# Patient Record
Sex: FEMALE | Race: BLACK OR AFRICAN AMERICAN | NOT HISPANIC OR LATINO | Employment: FULL TIME | URBAN - METROPOLITAN AREA
[De-identification: names, ages, dates, MRNs, and addresses within clinical notes are randomized per-mention and may not be internally consistent; named-entity substitution may affect disease eponyms.]

---

## 2022-01-28 ENCOUNTER — TRANSFERRED RECORDS (OUTPATIENT)
Dept: FAMILY MEDICINE | Facility: CLINIC | Age: 27
End: 2022-01-28

## 2022-11-11 ENCOUNTER — OFFICE VISIT (OUTPATIENT)
Dept: FAMILY MEDICINE | Facility: CLINIC | Age: 27
End: 2022-11-11

## 2022-11-11 VITALS
OXYGEN SATURATION: 98 % | SYSTOLIC BLOOD PRESSURE: 122 MMHG | WEIGHT: 198.8 LBS | HEART RATE: 69 BPM | DIASTOLIC BLOOD PRESSURE: 82 MMHG | RESPIRATION RATE: 16 BRPM | TEMPERATURE: 97.1 F | HEIGHT: 64 IN | BODY MASS INDEX: 33.94 KG/M2

## 2022-11-11 DIAGNOSIS — Z11.3 SCREEN FOR STD (SEXUALLY TRANSMITTED DISEASE): ICD-10-CM

## 2022-11-11 DIAGNOSIS — M67.40 GANGLION CYST: ICD-10-CM

## 2022-11-11 DIAGNOSIS — Z11.4 ENCOUNTER FOR SCREENING FOR HIV: ICD-10-CM

## 2022-11-11 DIAGNOSIS — Z00.00 ROUTINE GENERAL MEDICAL EXAMINATION AT A HEALTH CARE FACILITY: Primary | ICD-10-CM

## 2022-11-11 DIAGNOSIS — N64.52 NIPPLE DISCHARGE: ICD-10-CM

## 2022-11-11 DIAGNOSIS — T16.2XXA FB EAR, LEFT, INITIAL ENCOUNTER: ICD-10-CM

## 2022-11-11 DIAGNOSIS — J45.20 ASTHMA, MILD INTERMITTENT, WELL-CONTROLLED: ICD-10-CM

## 2022-11-11 DIAGNOSIS — Z11.59 NEED FOR HEPATITIS C SCREENING TEST: ICD-10-CM

## 2022-11-11 DIAGNOSIS — H60.392 OTHER INFECTIVE ACUTE OTITIS EXTERNA OF LEFT EAR: ICD-10-CM

## 2022-11-11 DIAGNOSIS — F33.9 RECURRENT MAJOR DEPRESSIVE DISORDER, REMISSION STATUS UNSPECIFIED (H): ICD-10-CM

## 2022-11-11 DIAGNOSIS — Z23 NEEDS FLU SHOT: ICD-10-CM

## 2022-11-11 PROBLEM — J30.2 SEASONAL ALLERGIES: Status: ACTIVE | Noted: 2021-10-21

## 2022-11-11 LAB
CHOL/HDL RATIO (RMG): 5.6 MG/DL (ref 0–4.5)
CHOLESTEROL: 241 MG/DL (ref 100–199)
HDL (RMG): 43 MG/DL (ref 40–?)
LDL CALCULATED (RMG): 180 MG/DL (ref 0–130)
PAP-ABSTRACT: NORMAL
TRIGLYCERIDES (RMG): 92 MG/DL (ref 0–149)

## 2022-11-11 PROCEDURE — 99395 PREV VISIT EST AGE 18-39: CPT | Performed by: FAMILY MEDICINE

## 2022-11-11 PROCEDURE — 99213 OFFICE O/P EST LOW 20 MIN: CPT | Mod: 25 | Performed by: FAMILY MEDICINE

## 2022-11-11 PROCEDURE — 69200 CLEAR OUTER EAR CANAL: CPT | Performed by: FAMILY MEDICINE

## 2022-11-11 PROCEDURE — 80061 LIPID PANEL: CPT | Mod: QW | Performed by: FAMILY MEDICINE

## 2022-11-11 PROCEDURE — 36415 COLL VENOUS BLD VENIPUNCTURE: CPT | Performed by: FAMILY MEDICINE

## 2022-11-11 RX ORDER — ALBUTEROL SULFATE 90 UG/1
2 AEROSOL, METERED RESPIRATORY (INHALATION)
COMMUNITY
Start: 2021-11-30 | End: 2023-04-27

## 2022-11-11 RX ORDER — FLUTICASONE PROPIONATE 220 UG/1
2 AEROSOL, METERED RESPIRATORY (INHALATION)
COMMUNITY
Start: 2021-11-30

## 2022-11-11 RX ORDER — ERGOCALCIFEROL 1.25 MG/1
50000 CAPSULE ORAL
COMMUNITY
Start: 2021-12-21 | End: 2022-11-11

## 2022-11-11 RX ORDER — VALACYCLOVIR HYDROCHLORIDE 1 G/1
2000 TABLET, FILM COATED ORAL
COMMUNITY
Start: 2022-04-14 | End: 2023-08-10

## 2022-11-11 RX ORDER — HYDROCODONE BITARTRATE AND ACETAMINOPHEN 5; 325 MG/1; MG/1
1-2 TABLET ORAL
COMMUNITY
Start: 2022-06-10 | End: 2022-11-11

## 2022-11-11 RX ORDER — LORATADINE 10 MG/1
10 TABLET ORAL
COMMUNITY
Start: 2021-10-12 | End: 2023-07-21

## 2022-11-11 ASSESSMENT — PATIENT HEALTH QUESTIONNAIRE - PHQ9
SUM OF ALL RESPONSES TO PHQ QUESTIONS 1-9: 6
5. POOR APPETITE OR OVEREATING: MORE THAN HALF THE DAYS

## 2022-11-11 ASSESSMENT — ANXIETY QUESTIONNAIRES
GAD7 TOTAL SCORE: 10
2. NOT BEING ABLE TO STOP OR CONTROL WORRYING: MORE THAN HALF THE DAYS
7. FEELING AFRAID AS IF SOMETHING AWFUL MIGHT HAPPEN: NOT AT ALL
GAD7 TOTAL SCORE: 10
6. BECOMING EASILY ANNOYED OR IRRITABLE: NOT AT ALL
1. FEELING NERVOUS, ANXIOUS, OR ON EDGE: MORE THAN HALF THE DAYS
5. BEING SO RESTLESS THAT IT IS HARD TO SIT STILL: MORE THAN HALF THE DAYS
3. WORRYING TOO MUCH ABOUT DIFFERENT THINGS: MORE THAN HALF THE DAYS

## 2022-11-11 ASSESSMENT — ASTHMA QUESTIONNAIRES: ACT_TOTALSCORE: 21

## 2022-11-11 NOTE — PROGRESS NOTES
"Female Preventive Health Visit    SUBJECTIVE:    This 27 year old female presents for a routine preventive physical exam.    The patient has the following concerns:     1. BMI 34 : 30 lbs intentional weight loss over last 7 months or so. Now working out and building muscle.     2. MDD / TUSHAR : fluoxetine 20 mg daily. Taking since Jan 2022. Reports good effect.     3. Asthma : onset in childhood. flovent 220 mcg 2 puffs BID during allergy season and for URIs. Albuterol prn. Not exercise induced.     4. Lesion to left hand present for 2 months. Right handed. Not painful. Not affecting function of hand. Right hand dominant.     5. Q tip stuck in left ear canal. Has been using to itch ear.    6. Chronic h/o right nipple discharge. 4-5 years. No bloody discharge. White discharge. No change in overlying skin of nipple. Mother with h/o breast cancer at age 48.     7. Requests full STD testing. No current symptoms.     Patient's medications, allergies, past medical, surgical and family histories were reviewed and updated as appropriate.    OB/Gyn History:      Last Pap Smear: Up to date per patient report : 10/2021   Current Contraceptive Method:  Mirena IUD      Health Maintenance:  Health maintenance alerts were reviewed and updated as appropriate.      Healthy Habits:    Do you get at least three servings of calcium containing foods daily (dairy, green leafy vegetables, etc.)? No    Amount of exercise or daily activities, outside of work: 3-4 day(s) per week    Problems taking medications regularly No    Medication side effects: No    Have you had an eye exam in the past two years? no    Do you see a dentist twice per year? yes    Do you have sleep apnea, excessive snoring or daytime drowsiness?no      OBJECTIVE:  Vitals:    11/11/22 1447   BP: 122/82   Pulse: 69   Resp: 16   Temp: 97.1  F (36.2  C)   SpO2: 98%   Weight: 90.2 kg (198 lb 12.8 oz)   Height: 1.613 m (5' 3.5\")    Body mass index is 34.66 kg/m .    General: no " acute distress, cooperative with exam.  HEENT:  Right ear canal moist with mild slough / discharge and erythema. Small piece of cotton very close to TM : removed with direct visualization without complication.     Neck:  Supple, no lymphadenopathy or thyromegaly   Breasts: breasts appear normal, no skin or nipple changes. Patient able to express white discharge from right nipple.  Lungs: clear to auscultation bilaterally, normal respiratory effort.  Heart:  RRR without murmurs, rubs or gallops.  Normal S1 and S2.  Abdomen: normal bowel sounds, nontender, no palpable organomegaly.  Skin:   No rashes.  Extremities: warm, perfused, no swelling or edema. Subcutaneous small marble sized mass on dorsal surface of  left hand. Moves with flexion of left index finger.  Neuro:  CN II-XII intact, motor & sensory function all intact.    Psych: mental status normal, mood and affect appropriate.    PHQ 11/11/2022   PHQ-9 Total Score 6   Q9: Thoughts of better off dead/self-harm past 2 weeks Not at all     TUSHAR-7 SCORE 11/11/2022   Total Score 10       ASSESSMENT / PLAN:  This 27 year old female presents for a routine preventive physical exam. Preventive health topics discussed including adequate exercise and healthy diet. Return to clinic in one year for preventative exam or sooner with any other concerns.  Other issues discussed as noted below.    Routine general medical examination at a health care facility    Asthma, mild intermittent, well-controlled  Well controlled. No refills needed.     Recurrent major depressive disorder, remission status unspecified (H)  -     FLUoxetine (PROZAC) 20 MG capsule; Take 1 capsule (20 mg) by mouth daily    BMI 34.0-34.9,adult  Commended on weight loss thus far.   -     Lipid Profile ([facility])  -     Hemoglobin A1C ([facility])    Need for hepatitis C screening test  -     HCV Antibody ([facility])    Screen for STD (sexually transmitted disease)  Encounter for screening for HIV  -     VENOUS  COLLECTION  -     HIV 1/0/2 Rflx ([facility])  -     Treponema pallidum Antibodies ([facility])  -     Chlamydia/GC Amplification ([facility])    Nipple discharge  -     MA Diagnostic Digital Bilateral; Future    FB ear, left, initial encounter  -     REMOVE FB, EXT AUDITORY CANAL    Other infective acute otitis externa of left ear  -     ciprofloxacin-hydrocortisone (CIPRO HC OTIC) 0.2-1 % otic suspension; Place 3 drops Into the left ear 2 times daily for 7 days    Ganglion cyst  Aetiology and natural progression discussed. Not causes symptoms at this time so will monitor. Referral to hand surgery if/when symptomatic.

## 2022-11-12 LAB — HBA1C MFR BLD: 5.7 % (ref 4.8–5.6)

## 2022-11-13 LAB
C TRACH DNA SPEC QL PROBE+SIG AMP: NEGATIVE
N GONORRHOEA DNA SPEC QL PROBE+SIG AMP: NEGATIVE

## 2022-11-14 LAB
HCV AB SERPL QL IA: 0.1 S/CO RATIO (ref 0–0.9)
HIV SCREEN 4TH GEN WITH RFLX: NON REACTIVE
T PALLIDUM IGG SER QL: NON REACTIVE

## 2022-11-14 RX ORDER — CIPROFLOXACIN AND DEXAMETHASONE 3; 1 MG/ML; MG/ML
4 SUSPENSION/ DROPS AURICULAR (OTIC) 2 TIMES DAILY
Qty: 2.8 ML | Refills: 0 | Status: SHIPPED | OUTPATIENT
Start: 2022-11-14 | End: 2023-02-11

## 2023-02-11 RX ORDER — CIPROFLOXACIN AND DEXAMETHASONE 3; 1 MG/ML; MG/ML
4 SUSPENSION/ DROPS AURICULAR (OTIC) 2 TIMES DAILY
Qty: 2.8 ML | Refills: 0 | Status: SHIPPED | OUTPATIENT
Start: 2023-02-11 | End: 2023-06-07

## 2023-02-12 ENCOUNTER — HEALTH MAINTENANCE LETTER (OUTPATIENT)
Age: 28
End: 2023-02-12

## 2023-04-27 DIAGNOSIS — J45.20 ASTHMA, MILD INTERMITTENT, WELL-CONTROLLED: Primary | ICD-10-CM

## 2023-04-27 RX ORDER — FLUTICASONE PROPIONATE 50 MCG
1 SPRAY, SUSPENSION (ML) NASAL DAILY
Qty: 16 ML | Refills: 3 | Status: SHIPPED | OUTPATIENT
Start: 2023-04-27

## 2023-04-27 RX ORDER — ALBUTEROL SULFATE 90 UG/1
2 AEROSOL, METERED RESPIRATORY (INHALATION) 4 TIMES DAILY
Qty: 18 G | Refills: 3 | Status: SHIPPED | OUTPATIENT
Start: 2023-04-27

## 2023-06-07 ENCOUNTER — APPOINTMENT (OUTPATIENT)
Dept: GENERAL RADIOLOGY | Facility: CLINIC | Age: 28
End: 2023-06-07
Attending: EMERGENCY MEDICINE

## 2023-06-07 ENCOUNTER — HOSPITAL ENCOUNTER (EMERGENCY)
Facility: CLINIC | Age: 28
Discharge: HOME | End: 2023-06-07
Attending: EMERGENCY MEDICINE | Admitting: EMERGENCY MEDICINE

## 2023-06-07 VITALS
WEIGHT: 212.4 LBS | OXYGEN SATURATION: 94 % | BODY MASS INDEX: 37.03 KG/M2 | TEMPERATURE: 98.7 F | SYSTOLIC BLOOD PRESSURE: 123 MMHG | HEART RATE: 94 BPM | RESPIRATION RATE: 16 BRPM | DIASTOLIC BLOOD PRESSURE: 88 MMHG

## 2023-06-07 DIAGNOSIS — M54.41 ACUTE RIGHT-SIDED LOW BACK PAIN WITH RIGHT-SIDED SCIATICA: ICD-10-CM

## 2023-06-07 DIAGNOSIS — H60.392 OTHER INFECTIVE ACUTE OTITIS EXTERNA OF LEFT EAR: ICD-10-CM

## 2023-06-07 LAB
ALBUMIN SERPL BCG-MCNC: 4.4 G/DL (ref 3.5–5.2)
ALP SERPL-CCNC: 109 U/L (ref 35–104)
ALT SERPL W P-5'-P-CCNC: 19 U/L (ref 10–35)
ANION GAP SERPL CALCULATED.3IONS-SCNC: 11 MMOL/L (ref 7–15)
AST SERPL W P-5'-P-CCNC: 22 U/L (ref 10–35)
BASOPHILS # BLD AUTO: 0.1 10E3/UL (ref 0–0.2)
BASOPHILS NFR BLD AUTO: 1 %
BILIRUB SERPL-MCNC: 0.3 MG/DL
BUN SERPL-MCNC: 10.5 MG/DL (ref 6–20)
CALCIUM SERPL-MCNC: 9.2 MG/DL (ref 8.6–10)
CHLORIDE SERPL-SCNC: 99 MMOL/L (ref 98–107)
CREAT SERPL-MCNC: 0.93 MG/DL (ref 0.51–0.95)
DEPRECATED HCO3 PLAS-SCNC: 27 MMOL/L (ref 22–29)
EOSINOPHIL # BLD AUTO: 0.1 10E3/UL (ref 0–0.7)
EOSINOPHIL NFR BLD AUTO: 2 %
ERYTHROCYTE [DISTWIDTH] IN BLOOD BY AUTOMATED COUNT: 12.8 % (ref 10–15)
GFR SERPL CREATININE-BSD FRML MDRD: 87 ML/MIN/1.73M2
GLUCOSE SERPL-MCNC: 80 MG/DL (ref 70–99)
HCG SERPL QL: NEGATIVE
HCT VFR BLD AUTO: 47.9 % (ref 35–47)
HGB BLD-MCNC: 15.6 G/DL (ref 11.7–15.7)
HOLD SPECIMEN: NORMAL
IMM GRANULOCYTES # BLD: 0 10E3/UL
IMM GRANULOCYTES NFR BLD: 0 %
LYMPHOCYTES # BLD AUTO: 2.1 10E3/UL (ref 0.8–5.3)
LYMPHOCYTES NFR BLD AUTO: 49 %
MCH RBC QN AUTO: 30.5 PG (ref 26.5–33)
MCHC RBC AUTO-ENTMCNC: 32.6 G/DL (ref 31.5–36.5)
MCV RBC AUTO: 94 FL (ref 78–100)
MONOCYTES # BLD AUTO: 0.3 10E3/UL (ref 0–1.3)
MONOCYTES NFR BLD AUTO: 6 %
NEUTROPHILS # BLD AUTO: 1.7 10E3/UL (ref 1.6–8.3)
NEUTROPHILS NFR BLD AUTO: 42 %
NRBC # BLD AUTO: 0 10E3/UL
NRBC BLD AUTO-RTO: 0 /100
PLATELET # BLD AUTO: 246 10E3/UL (ref 150–450)
POTASSIUM SERPL-SCNC: 3.5 MMOL/L (ref 3.4–5.3)
PROT SERPL-MCNC: 8.4 G/DL (ref 6.4–8.3)
RBC # BLD AUTO: 5.12 10E6/UL (ref 3.8–5.2)
SODIUM SERPL-SCNC: 137 MMOL/L (ref 136–145)
WBC # BLD AUTO: 4.2 10E3/UL (ref 4–11)

## 2023-06-07 PROCEDURE — 250N000011 HC RX IP 250 OP 636: Performed by: EMERGENCY MEDICINE

## 2023-06-07 PROCEDURE — 84703 CHORIONIC GONADOTROPIN ASSAY: CPT | Performed by: EMERGENCY MEDICINE

## 2023-06-07 PROCEDURE — 99284 EMERGENCY DEPT VISIT MOD MDM: CPT | Performed by: EMERGENCY MEDICINE

## 2023-06-07 PROCEDURE — 250N000013 HC RX MED GY IP 250 OP 250 PS 637: Performed by: EMERGENCY MEDICINE

## 2023-06-07 PROCEDURE — 96374 THER/PROPH/DIAG INJ IV PUSH: CPT | Performed by: EMERGENCY MEDICINE

## 2023-06-07 PROCEDURE — 84155 ASSAY OF PROTEIN SERUM: CPT | Performed by: EMERGENCY MEDICINE

## 2023-06-07 PROCEDURE — 36415 COLL VENOUS BLD VENIPUNCTURE: CPT | Performed by: EMERGENCY MEDICINE

## 2023-06-07 PROCEDURE — 99284 EMERGENCY DEPT VISIT MOD MDM: CPT | Mod: 25 | Performed by: EMERGENCY MEDICINE

## 2023-06-07 PROCEDURE — 85025 COMPLETE CBC W/AUTO DIFF WBC: CPT | Performed by: EMERGENCY MEDICINE

## 2023-06-07 PROCEDURE — 72100 X-RAY EXAM L-S SPINE 2/3 VWS: CPT

## 2023-06-07 RX ORDER — METHYLPREDNISOLONE 4 MG
TABLET, DOSE PACK ORAL
Qty: 21 TABLET | Refills: 0 | Status: SHIPPED | OUTPATIENT
Start: 2023-06-07 | End: 2023-08-10

## 2023-06-07 RX ORDER — CIPROFLOXACIN AND DEXAMETHASONE 3; 1 MG/ML; MG/ML
4 SUSPENSION/ DROPS AURICULAR (OTIC) 2 TIMES DAILY
Qty: 2.8 ML | Refills: 0 | Status: SHIPPED | OUTPATIENT
Start: 2023-06-07 | End: 2023-07-14

## 2023-06-07 RX ORDER — KETOROLAC TROMETHAMINE 15 MG/ML
10 INJECTION, SOLUTION INTRAMUSCULAR; INTRAVENOUS ONCE
Status: COMPLETED | OUTPATIENT
Start: 2023-06-07 | End: 2023-06-07

## 2023-06-07 RX ORDER — CYCLOBENZAPRINE HCL 10 MG
10 TABLET ORAL ONCE
Status: COMPLETED | OUTPATIENT
Start: 2023-06-07 | End: 2023-06-07

## 2023-06-07 RX ORDER — CYCLOBENZAPRINE HCL 10 MG
10 TABLET ORAL 3 TIMES DAILY PRN
Qty: 20 TABLET | Refills: 0 | Status: SHIPPED | OUTPATIENT
Start: 2023-06-07 | End: 2023-06-13

## 2023-06-07 RX ADMIN — CYCLOBENZAPRINE 10 MG: 10 TABLET, FILM COATED ORAL at 14:23

## 2023-06-07 RX ADMIN — KETOROLAC TROMETHAMINE 15 MG: 15 INJECTION, SOLUTION INTRAMUSCULAR; INTRAVENOUS at 14:23

## 2023-06-07 ASSESSMENT — ACTIVITIES OF DAILY LIVING (ADL)
ADLS_ACUITY_SCORE: 33
ADLS_ACUITY_SCORE: 35

## 2023-06-07 NOTE — ED TRIAGE NOTES
Hurt back at work the a few weeks ago. Shooting pain in low back down to R leg. Unable to get out of bed the other night.

## 2023-06-07 NOTE — ED PROVIDER NOTES
[facility] EMERGENCY DEPARTMENT ([facility])    6/07/23      ED PROVIDER NOTE     History     Chief Complaint   Patient presents with     Back Pain     The history is provided by the patient and medical records.     Liz Monroe is a 27 year old female who presents with low back pain.  This occurred several days ago after lifting heavy boxes at work.  She states that she initially felt something was off on her back but did not have pain.  She then developed right-sided low back pain which became more severe upon waking the next day.  She notes right-sided low back pain that radiates down her right leg.  Symptoms are worsened with movement after prolonged lying, sitting, or standing.  No history of low back injuries or low back pain in the past.  Patient saw chiropractor for the pain but became worse after treatment.  No problems with bowel or bladder.  No other symptoms noted.    No other symptoms noted.      PAST MEDICAL HISTORY: No past medical history on file.    PAST SURGICAL HISTORY: No past surgical history on file.    Past medical history, past surgical history, medications, and allergies were reviewed with the patient. Additional pertinent items: None    FAMILY HISTORY: No family history on file.    SOCIAL HISTORY:   Social History     Tobacco Use     Smoking status: Former     Types: Cigarettes     Smokeless tobacco: Never   Vaping Use     Vaping status: Not on file   Substance Use Topics     Alcohol use: Yes     Social history was reviewed with the patient. Additional pertinent items: None    Patient's Medications   New Prescriptions    No medications on file   Previous Medications    ALBUTEROL (PROAIR HFA/PROVENTIL HFA/VENTOLIN HFA) 108 (90 BASE) MCG/ACT INHALER    Inhale 2 puffs into the lungs 4 times daily    CIPROFLOXACIN-DEXAMETHASONE (CIPRODEX) 0.3-0.1 % OTIC SUSPENSION    Place 4 drops Into the left ear 2 times daily    FLUOXETINE (PROZAC) 20 MG CAPSULE    Take 1 capsule (20 mg) by mouth  daily    FLUTICASONE (FLONASE) 50 MCG/ACT NASAL SPRAY    Spray 1 spray into both nostrils daily    FLUTICASONE (FLOVENT HFA) 220 MCG/ACT INHALER    Inhale 2 puffs into the lungs    LEVONORGESTREL (MIRENA) 20 MCG/DAY IUD    1 each by Intrauterine route once    LORATADINE (CLARITIN) 10 MG TABLET    Take 10 mg by mouth    VALACYCLOVIR (VALTREX) 1000 MG TABLET    Take 2,000 mg by mouth   Modified Medications    No medications on file   Discontinued Medications    No medications on file        No Known Allergies    A complete review of systems was performed with pertinent positives and negatives noted in the HPI, and all other systems negative.    Physical Exam   BP: 120/78  Pulse: 91  Temp: 98.7  F (37.1  C)  Resp: 16  Weight: 96.3 kg (212 lb 6.4 oz)  SpO2: 98 %      Physical Exam  Vitals and nursing note reviewed.   Constitutional:       General: She is not in acute distress.     Appearance: She is well-developed. She is not diaphoretic.   HENT:      Head: Normocephalic and atraumatic.      Mouth/Throat:      Pharynx: No oropharyngeal exudate.   Eyes:      General: No scleral icterus.        Right eye: No discharge.         Left eye: No discharge.      Pupils: Pupils are equal, round, and reactive to light.   Cardiovascular:      Rate and Rhythm: Normal rate and regular rhythm.      Heart sounds: Normal heart sounds. No murmur heard.     No friction rub. No gallop.   Pulmonary:      Effort: Pulmonary effort is normal. No respiratory distress.      Breath sounds: Normal breath sounds. No wheezing.   Chest:      Chest wall: No tenderness.   Abdominal:      General: Bowel sounds are normal. There is no distension.      Palpations: Abdomen is soft.      Tenderness: There is no abdominal tenderness.   Musculoskeletal:         General: No tenderness or deformity. Normal range of motion.      Cervical back: Normal range of motion and neck supple.        Legs:    Skin:     General: Skin is warm and dry.      Coloration: Skin  is not pale.      Findings: No erythema or rash.   Neurological:      Mental Status: She is alert and oriented to person, place, and time.      Cranial Nerves: No cranial nerve deficit.      Comments: Full strength and sensation lower extremities bilaterally.  No saddle anesthesia.         ED Course        Procedures                         Labs Ordered and Resulted from Time of ED Arrival to Time of ED Departure - No data to display         Critical care was not performed.          Assessments & Plan (with Medical Decision Making)   This is a 27-year-old female who presents with low back pain.  This occurred after heavy lifting.  She notes right-sided low back pain with radiation down her right leg.  On exam she has right lower paraspinal muscle tenderness.  She has full strength and sensation lower extremities bilaterally and no saddle anesthesia.  Lab work shows no acute abnormalities.  X-rays show no acute abnormalities.  Symptoms appear to be consistent with musculoskeletal low back pain with right-sided sciatica.  Patient was given ketorolac and cyclobenzaprine.  She had improvement in symptoms with this.  Due to the sciatica component I believe patient would benefit from a course of steroids and after discussion we will start patient on a Medrol Dosepak.  We will also provide cyclobenzaprine for pain relief.  We will discharge with return precautions.    I have reviewed the nursing notes.    I have reviewed the findings, diagnosis, plan and need for follow up with the patient.    New Prescriptions    No medications on file       Final diagnoses:   None       Lexx Argueta DO    6/7/2023   [facility] EMERGENCY DEPARTMENT       Lexx Argueta,   06/07/23 1937

## 2023-06-07 NOTE — LETTER
June 7, 2023      To Whom It May Concern:      Liz Monroe was seen in our Emergency Department today, 06/07/23.  I expect her condition to improve over the next 2 days.  She may return to work when improved.    Sincerely,        Lexx Argueta, DO

## 2023-06-14 ENCOUNTER — OFFICE VISIT (OUTPATIENT)
Dept: FAMILY MEDICINE | Facility: CLINIC | Age: 28
End: 2023-06-14

## 2023-06-14 VITALS
DIASTOLIC BLOOD PRESSURE: 57 MMHG | OXYGEN SATURATION: 98 % | HEIGHT: 64 IN | WEIGHT: 211.8 LBS | HEART RATE: 120 BPM | BODY MASS INDEX: 36.16 KG/M2 | SYSTOLIC BLOOD PRESSURE: 116 MMHG

## 2023-06-14 DIAGNOSIS — F33.9 RECURRENT MAJOR DEPRESSIVE DISORDER, REMISSION STATUS UNSPECIFIED (H): Primary | ICD-10-CM

## 2023-06-14 DIAGNOSIS — E66.01 MORBID OBESITY (H): ICD-10-CM

## 2023-06-14 PROCEDURE — 99215 OFFICE O/P EST HI 40 MIN: CPT | Performed by: FAMILY MEDICINE

## 2023-06-14 RX ORDER — PHENTERMINE HYDROCHLORIDE 37.5 MG/1
18.75 TABLET ORAL
Qty: 45 TABLET | Refills: 0 | Status: SHIPPED | OUTPATIENT
Start: 2023-06-14 | End: 2023-08-10

## 2023-06-14 ASSESSMENT — ANXIETY QUESTIONNAIRES
1. FEELING NERVOUS, ANXIOUS, OR ON EDGE: NEARLY EVERY DAY
7. FEELING AFRAID AS IF SOMETHING AWFUL MIGHT HAPPEN: NOT AT ALL
5. BEING SO RESTLESS THAT IT IS HARD TO SIT STILL: NOT AT ALL
GAD7 TOTAL SCORE: 7
GAD7 TOTAL SCORE: 7
2. NOT BEING ABLE TO STOP OR CONTROL WORRYING: SEVERAL DAYS
6. BECOMING EASILY ANNOYED OR IRRITABLE: NOT AT ALL
3. WORRYING TOO MUCH ABOUT DIFFERENT THINGS: SEVERAL DAYS

## 2023-06-14 ASSESSMENT — PATIENT HEALTH QUESTIONNAIRE - PHQ9
SUM OF ALL RESPONSES TO PHQ QUESTIONS 1-9: 10
5. POOR APPETITE OR OVEREATING: MORE THAN HALF THE DAYS

## 2023-06-14 ASSESSMENT — ASTHMA QUESTIONNAIRES: ACT_TOTALSCORE: 25

## 2023-06-14 NOTE — PATIENT INSTRUCTIONS
"-You have started to be mindful about eating.     -\"My brain like to eat\" : have a think about what need your brain is trying to meet with food.     -\"Actively I have the choice to not eat it and I do\"    - we are starting on a medication that causes some appetite suppression. Use this to assist with your journey of being more mindful about eating habits.       "
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
## 2023-07-14 ENCOUNTER — TELEPHONE (OUTPATIENT)
Dept: FAMILY MEDICINE | Facility: CLINIC | Age: 28
End: 2023-07-14

## 2023-07-14 DIAGNOSIS — H60.392 OTHER INFECTIVE ACUTE OTITIS EXTERNA OF LEFT EAR: ICD-10-CM

## 2023-07-14 RX ORDER — CIPROFLOXACIN AND DEXAMETHASONE 3; 1 MG/ML; MG/ML
4 SUSPENSION/ DROPS AURICULAR (OTIC) 2 TIMES DAILY
Qty: 2.8 ML | Refills: 0 | Status: SHIPPED | OUTPATIENT
Start: 2023-07-14 | End: 2023-07-21

## 2023-07-14 NOTE — TELEPHONE ENCOUNTER
Patient calls reporting has appt at 8am with Dr. Glasgow today but missed it due to overslept. one of the issues she was going to discuss was her external ear infection is flaring causing itchy ears. States this is a recurring issue for her that she typically treats with Ciprodex ear drops with good results. Requesting refill on these drops.     Per [facility] med history, drops last ordered:  6/7/23 (urgent care);  2/11/23 (Dr. Glasgow); 11/14/22 (Dr. Glasgow).     Plan: okay per Dr. Glasgow to send refill. Rx sent. Patient informed. Should RTC if symptoms fail to respond to the gtts.   Patient has rescheduled her weight management appt for 8/10/23.

## 2023-07-21 ENCOUNTER — MYC REFILL (OUTPATIENT)
Dept: FAMILY MEDICINE | Facility: CLINIC | Age: 28
End: 2023-07-21

## 2023-07-21 DIAGNOSIS — H60.392 OTHER INFECTIVE ACUTE OTITIS EXTERNA OF LEFT EAR: ICD-10-CM

## 2023-07-21 DIAGNOSIS — J30.2 SEASONAL ALLERGIES: Primary | ICD-10-CM

## 2023-07-24 RX ORDER — LORATADINE 10 MG/1
10 TABLET ORAL DAILY
Qty: 90 TABLET | Refills: 0 | Status: SHIPPED | OUTPATIENT
Start: 2023-07-24

## 2023-07-24 RX ORDER — CIPROFLOXACIN AND DEXAMETHASONE 3; 1 MG/ML; MG/ML
4 SUSPENSION/ DROPS AURICULAR (OTIC) 2 TIMES DAILY
Qty: 2.8 ML | Refills: 0 | Status: SHIPPED | OUTPATIENT
Start: 2023-07-24

## 2023-08-10 ENCOUNTER — VIRTUAL VISIT (OUTPATIENT)
Dept: FAMILY MEDICINE | Facility: CLINIC | Age: 28
End: 2023-08-10

## 2023-08-10 DIAGNOSIS — N64.52 NIPPLE DISCHARGE IN FEMALE: ICD-10-CM

## 2023-08-10 DIAGNOSIS — E66.812 CLASS 2 OBESITY WITHOUT SERIOUS COMORBIDITY WITH BODY MASS INDEX (BMI) OF 36.0 TO 36.9 IN ADULT, UNSPECIFIED OBESITY TYPE: ICD-10-CM

## 2023-08-10 DIAGNOSIS — F33.9 RECURRENT MAJOR DEPRESSIVE DISORDER, REMISSION STATUS UNSPECIFIED (H): Primary | ICD-10-CM

## 2023-08-10 PROCEDURE — 99214 OFFICE O/P EST MOD 30 MIN: CPT | Mod: 95 | Performed by: FAMILY MEDICINE

## 2023-08-10 NOTE — PROGRESS NOTES
PRIMARY CARE WEIGHT MANAGEMENT PROGRESS NOTE: Virtual visit    CHIEF COMPLAINT:  Liz Monroe is a 28 year old female who is following up for medical weight management.     Medications:   Phentermine 18.75 mg daily. No effect.     Nutrition/Behavior:   Some reduction in snacking    Physical Activity:   Area for future discussion.     Other issues:    Chronic h/o right nipple discharge. 4-5 years. No bloody discharge. White discharge. No change in overlying skin of nipple. Mother with h/o breast cancer at age 48. Discussed at annual physical November 2022 ; patient has omitted to schedule diagnostic mammogram as yet .  MDD / TUSHAR : fluoxetine 20 mg daily. Taking since Jan 2022. Reports good effect.  Reports having lost ssri medication (missing from her car). Needs refill      WEIGHT HISTORY:   Wt Readings from Last 3 Encounters:   06/14/23 96.1 kg (211 lb 12.8 oz)   06/07/23 96.3 kg (212 lb 6.4 oz)   11/11/22 90.2 kg (198 lb 12.8 oz)       PHYSICAL EXAM:  VITAL SIGNS:  virtual visit.    Psych: mental status normal, mood and affect appropriate.      ASSESSMENT/PLAN:  Liz Monroe is a 28 year old female who is following up for medical weight management.      Recurrent major depressive disorder, remission status unspecified (H)   Reports having lost ssri medication (missing from her car). Needs refill  -     FLUoxetine (PROZAC) 20 MG capsule; Take 1 capsule (20 mg) by mouth daily    Class 2 obesity without serious comorbidity with body mass index (BMI) of 36.0 to 36.9 in adult, unspecified obesity type  BMI 36.0-36.9,adult  After discussion, would like to try GLP 1 agonist for weight loss. Mechanism of action, common side effects and how to take discussed.     Discussed :  The medication you have been prescribed today will need a prior authorization (PA) before your pharmacy will be able to issue it to you.   Know that our triage team are working on the PA. The turn around time can be up to 1 week on this.   You will  be notified of the decision by either our office or your pharmacy. Please only call if you have not heard anything after 7 working days.     -     Semaglutide-Weight Management (WEGOVY) 0.25 MG/0.5ML pen; Inject 0.25 mg Subcutaneous once a week    Nipple discharge in female   Discussed at annual physical November 2022 ; patient has omitted to schedule diagnostic mammogram as yet .  -     MAMMO - Diagnostic Digital Bilateral ([facility]); Future      Follow up with me: 1 month, in person for   -weight check   -labs for PCOS Dx.

## 2023-08-25 ENCOUNTER — TELEPHONE (OUTPATIENT)
Dept: FAMILY MEDICINE | Facility: CLINIC | Age: 28
End: 2023-08-25

## 2023-12-31 ENCOUNTER — HEALTH MAINTENANCE LETTER (OUTPATIENT)
Age: 28
End: 2023-12-31

## 2025-01-19 ENCOUNTER — HEALTH MAINTENANCE LETTER (OUTPATIENT)
Age: 30
End: 2025-01-19